# Patient Record
Sex: FEMALE | Race: WHITE | NOT HISPANIC OR LATINO
[De-identification: names, ages, dates, MRNs, and addresses within clinical notes are randomized per-mention and may not be internally consistent; named-entity substitution may affect disease eponyms.]

---

## 2017-02-08 ENCOUNTER — OTHER (OUTPATIENT)
Age: 67
End: 2017-02-08

## 2017-07-26 ENCOUNTER — APPOINTMENT (OUTPATIENT)
Dept: PULMONOLOGY | Facility: CLINIC | Age: 67
End: 2017-07-26

## 2017-07-26 VITALS — HEART RATE: 69 BPM | OXYGEN SATURATION: 97 % | SYSTOLIC BLOOD PRESSURE: 116 MMHG | DIASTOLIC BLOOD PRESSURE: 80 MMHG

## 2017-07-26 VITALS — WEIGHT: 116 LBS

## 2018-01-10 ENCOUNTER — APPOINTMENT (OUTPATIENT)
Dept: PULMONOLOGY | Facility: CLINIC | Age: 68
End: 2018-01-10
Payer: COMMERCIAL

## 2018-01-10 VITALS
SYSTOLIC BLOOD PRESSURE: 128 MMHG | WEIGHT: 117.5 LBS | OXYGEN SATURATION: 98 % | HEART RATE: 68 BPM | DIASTOLIC BLOOD PRESSURE: 72 MMHG

## 2018-01-10 PROCEDURE — 99214 OFFICE O/P EST MOD 30 MIN: CPT

## 2018-01-10 RX ORDER — ZOLPIDEM TARTRATE 5 MG/1
5 TABLET ORAL
Qty: 1 | Refills: 0 | Status: DISCONTINUED | COMMUNITY
Start: 2017-07-26 | End: 2018-01-10

## 2018-02-09 ENCOUNTER — RECORD ABSTRACTING (OUTPATIENT)
Age: 68
End: 2018-02-09

## 2018-02-09 DIAGNOSIS — Z82.49 FAMILY HISTORY OF ISCHEMIC HEART DISEASE AND OTHER DISEASES OF THE CIRCULATORY SYSTEM: ICD-10-CM

## 2018-02-09 DIAGNOSIS — K21.9 GASTRO-ESOPHAGEAL REFLUX DISEASE W/OUT ESOPHAGITIS: ICD-10-CM

## 2018-02-09 DIAGNOSIS — Z92.89 PERSONAL HISTORY OF OTHER MEDICAL TREATMENT: ICD-10-CM

## 2018-02-09 RX ORDER — ASPIRIN 81 MG
81 TABLET, DELAYED RELEASE (ENTERIC COATED) ORAL DAILY
Refills: 0 | Status: ACTIVE | COMMUNITY

## 2018-02-09 RX ORDER — ACETAMINOPHEN 325 MG/1
325 TABLET, FILM COATED ORAL
Refills: 0 | Status: ACTIVE | COMMUNITY

## 2018-03-05 ENCOUNTER — RX RENEWAL (OUTPATIENT)
Age: 68
End: 2018-03-05

## 2018-04-25 ENCOUNTER — RECORD ABSTRACTING (OUTPATIENT)
Age: 68
End: 2018-04-25

## 2018-04-26 ENCOUNTER — APPOINTMENT (OUTPATIENT)
Dept: CARDIOLOGY | Facility: CLINIC | Age: 68
End: 2018-04-26
Payer: COMMERCIAL

## 2018-04-26 VITALS
HEIGHT: 60 IN | BODY MASS INDEX: 23.16 KG/M2 | WEIGHT: 118 LBS | RESPIRATION RATE: 14 BRPM | DIASTOLIC BLOOD PRESSURE: 70 MMHG | HEART RATE: 72 BPM | SYSTOLIC BLOOD PRESSURE: 117 MMHG
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
## 2018-04-26 PROCEDURE — 99214 OFFICE O/P EST MOD 30 MIN: CPT

## 2018-04-26 PROCEDURE — 93000 ELECTROCARDIOGRAM COMPLETE: CPT

## 2018-07-13 ENCOUNTER — APPOINTMENT (OUTPATIENT)
Dept: PULMONOLOGY | Facility: CLINIC | Age: 68
End: 2018-07-13
Payer: COMMERCIAL

## 2018-07-13 VITALS
HEIGHT: 60 IN | SYSTOLIC BLOOD PRESSURE: 120 MMHG | WEIGHT: 120 LBS | HEART RATE: 68 BPM | OXYGEN SATURATION: 96 % | BODY MASS INDEX: 23.56 KG/M2 | DIASTOLIC BLOOD PRESSURE: 70 MMHG

## 2018-07-13 PROCEDURE — 94010 BREATHING CAPACITY TEST: CPT

## 2018-07-13 PROCEDURE — 99214 OFFICE O/P EST MOD 30 MIN: CPT | Mod: 25

## 2018-07-19 ENCOUNTER — RX RENEWAL (OUTPATIENT)
Age: 68
End: 2018-07-19

## 2018-07-26 ENCOUNTER — MOBILE ON CALL (OUTPATIENT)
Age: 68
End: 2018-07-26

## 2018-07-27 ENCOUNTER — RX RENEWAL (OUTPATIENT)
Age: 68
End: 2018-07-27

## 2018-10-17 ENCOUNTER — APPOINTMENT (OUTPATIENT)
Dept: CARDIOLOGY | Facility: CLINIC | Age: 68
End: 2018-10-17
Payer: COMMERCIAL

## 2018-10-17 VITALS
RESPIRATION RATE: 14 BRPM | HEIGHT: 60 IN | SYSTOLIC BLOOD PRESSURE: 139 MMHG | WEIGHT: 120 LBS | DIASTOLIC BLOOD PRESSURE: 70 MMHG | BODY MASS INDEX: 23.56 KG/M2 | HEART RATE: 67 BPM

## 2018-10-17 VITALS — SYSTOLIC BLOOD PRESSURE: 126 MMHG | DIASTOLIC BLOOD PRESSURE: 78 MMHG

## 2018-10-17 DIAGNOSIS — I87.2 VENOUS INSUFFICIENCY (CHRONIC) (PERIPHERAL): ICD-10-CM

## 2018-10-17 PROCEDURE — 99214 OFFICE O/P EST MOD 30 MIN: CPT

## 2018-10-17 PROCEDURE — 93000 ELECTROCARDIOGRAM COMPLETE: CPT

## 2018-10-17 RX ORDER — NICOTINE 21-14-7MG
21-14-7 KIT TRANSDERMAL
Qty: 1 | Refills: 0 | Status: DISCONTINUED | COMMUNITY
Start: 2018-04-26 | End: 2018-10-17

## 2018-10-17 RX ORDER — ADHESIVE TAPE 3"X 2.3 YD
50 MCG TAPE, NON-MEDICATED TOPICAL
Refills: 0 | Status: ACTIVE | COMMUNITY

## 2018-10-17 RX ORDER — KRILL/OM-3/DHA/EPA/PHOSPHO/AST 350MG-90MG
CAPSULE ORAL
Refills: 0 | Status: ACTIVE | COMMUNITY

## 2018-10-17 RX ORDER — TOBRAMYCIN AND DEXAMETHASONE 3; 1 MG/ML; MG/ML
0.3-0.1 SUSPENSION/ DROPS OPHTHALMIC
Qty: 5 | Refills: 0 | Status: DISCONTINUED | COMMUNITY
Start: 2018-04-13 | End: 2018-10-17

## 2018-11-07 ENCOUNTER — APPOINTMENT (OUTPATIENT)
Dept: CARDIOLOGY | Facility: CLINIC | Age: 68
End: 2018-11-07
Payer: COMMERCIAL

## 2018-11-07 PROCEDURE — 93306 TTE W/DOPPLER COMPLETE: CPT

## 2019-01-02 ENCOUNTER — APPOINTMENT (OUTPATIENT)
Dept: CARDIOLOGY | Facility: CLINIC | Age: 69
End: 2019-01-02

## 2019-01-15 ENCOUNTER — APPOINTMENT (OUTPATIENT)
Dept: PULMONOLOGY | Facility: CLINIC | Age: 69
End: 2019-01-15
Payer: COMMERCIAL

## 2019-01-15 VITALS — SYSTOLIC BLOOD PRESSURE: 110 MMHG | OXYGEN SATURATION: 96 % | DIASTOLIC BLOOD PRESSURE: 62 MMHG | HEART RATE: 65 BPM

## 2019-01-15 VITALS — WEIGHT: 121 LBS | HEIGHT: 59.5 IN | BODY MASS INDEX: 24.07 KG/M2

## 2019-01-15 PROCEDURE — 99214 OFFICE O/P EST MOD 30 MIN: CPT | Mod: 25

## 2019-01-15 PROCEDURE — 94010 BREATHING CAPACITY TEST: CPT

## 2019-01-15 RX ORDER — CICLOPIROX 80 MG/ML
8 SOLUTION TOPICAL
Qty: 66 | Refills: 0 | Status: DISCONTINUED | COMMUNITY
Start: 2018-02-22 | End: 2019-01-15

## 2019-01-15 NOTE — DISCUSSION/SUMMARY
[COPD] : chronic obstructive pulmonary disease [Current Tobacco Use] : current tobacco use [Stable] : stable [Responding to Treatment] : responding to treatment [Stage II (Moderate)] : stage II (moderate) [None] : There are no changes in medication management [Patient] : the patient

## 2019-01-15 NOTE — PROCEDURE
[___%] : last visit [unfilled]U% [Spirometry] : stable [FreeTextEntry1] : Chest CT performed as a radiology on 4/27/18 demonstrated stable tiny pulmonary nodules. Bilateral measuring largest 7 mm in the left lower lobe. No new nodules seen. Minimal scattered focal density in the posterior segment of the trachea most likely related to secretions. (Films reviewed by PACs)\par \par

## 2019-01-15 NOTE — PHYSICAL EXAM
[General Appearance - Well Developed] : well developed [Normal Appearance] : normal appearance [Well Groomed] : well groomed [General Appearance - Well Nourished] : well nourished [No Deformities] : no deformities [General Appearance - In No Acute Distress] : no acute distress [Normal Conjunctiva] : the conjunctiva exhibited no abnormalities [Eyelids - No Xanthelasma] : the eyelids demonstrated no xanthelasmas [Normal Oropharynx] : normal oropharynx [IV] : IV [Neck Appearance] : the appearance of the neck was normal [Neck Cervical Mass (___cm)] : no neck mass was observed [Jugular Venous Distention Increased] : there was no jugular-venous distention [Thyroid Diffuse Enlargement] : the thyroid was not enlarged [Thyroid Nodule] : there were no palpable thyroid nodules [Neck Circumference: ___] : neck circumference is [unfilled] [Heart Rate And Rhythm] : heart rate and rhythm were normal [Heart Sounds] : normal S1 and S2 [Murmurs] : no murmurs present [Respiration, Rhythm And Depth] : normal respiratory rhythm and effort [Exaggerated Use Of Accessory Muscles For Inspiration] : no accessory muscle use [Auscultation Breath Sounds / Voice Sounds] : lungs were clear to auscultation bilaterally [Abdomen Soft] : soft [Abdomen Tenderness] : non-tender [Abdomen Mass (___ Cm)] : no abdominal mass palpated [Abnormal Walk] : normal gait [Gait - Sufficient For Exercise Testing] : the gait was sufficient for exercise testing [Nail Clubbing] : no clubbing of the fingernails [Cyanosis, Localized] : no localized cyanosis [Petechial Hemorrhages (___cm)] : no petechial hemorrhages [Skin Color & Pigmentation] : normal skin color and pigmentation [] : no rash [No Venous Stasis] : no venous stasis [Skin Lesions] : no skin lesions [No Skin Ulcers] : no skin ulcer [No Xanthoma] : no  xanthoma was observed [Deep Tendon Reflexes (DTR)] : deep tendon reflexes were 2+ and symmetric [Sensation] : the sensory exam was normal to light touch and pinprick [No Focal Deficits] : no focal deficits

## 2019-01-15 NOTE — HISTORY OF PRESENT ILLNESS
[Doing Well] : doing well [None] : The patient is currently asymptomatic [Difficulty Breathing During Exertion] : denies dyspnea on exertion [Feelings Of Weakness On Exertion] : denies exercise intolerance [Cough] : denies coughing [Wheezing] : denies wheezing [Regional Soft Tissue Swelling Both Lower Extremities] : denies lower extremity edema [Cigarette ___ppd] : [unfilled] cigarette pack(s) per day [Adherent] : the patient is adherent with ~his/her~ medication regimen ["Doesn't Seem To Help"] : belief that the medication is not helping [Not Taking due to Cost of Med] : not taking the medication due to the cost [de-identified] : During endoscopy had episode apnea following propofol, Was brought to [facility] ER and neg.1/11/19 [de-identified] : some hoarsenes and tired post procedure

## 2019-01-15 NOTE — CONSULT LETTER
[Dear  ___] : Dear  [unfilled], [Consult Letter:] : I had the pleasure of evaluating your patient, [unfilled]. [Please see my note below.] : Please see my note below. [Sincerely,] : Sincerely, [Sunny Fowler MD] : Sunny Fowler MD [FreeTextEntry3] : Sunny Fowler MD FCCP\par Pulmonary/Critical Care/Sleep Medicine\par Department of Internal Medicine\par \par [facility] [employer]\par

## 2019-01-29 ENCOUNTER — NON-APPOINTMENT (OUTPATIENT)
Age: 69
End: 2019-01-29

## 2019-01-29 ENCOUNTER — APPOINTMENT (OUTPATIENT)
Dept: CARDIOLOGY | Facility: CLINIC | Age: 69
End: 2019-01-29
Payer: COMMERCIAL

## 2019-01-29 VITALS — SYSTOLIC BLOOD PRESSURE: 140 MMHG | DIASTOLIC BLOOD PRESSURE: 70 MMHG

## 2019-01-29 VITALS
DIASTOLIC BLOOD PRESSURE: 79 MMHG | BODY MASS INDEX: 24.39 KG/M2 | WEIGHT: 121 LBS | HEART RATE: 77 BPM | HEIGHT: 59 IN | SYSTOLIC BLOOD PRESSURE: 136 MMHG | RESPIRATION RATE: 14 BRPM

## 2019-01-29 DIAGNOSIS — R07.9 CHEST PAIN, UNSPECIFIED: ICD-10-CM

## 2019-01-29 PROCEDURE — 99214 OFFICE O/P EST MOD 30 MIN: CPT

## 2019-01-29 PROCEDURE — 93000 ELECTROCARDIOGRAM COMPLETE: CPT

## 2019-01-29 RX ORDER — BUPROPION HYDROCHLORIDE 100 MG/1
TABLET, FILM COATED ORAL
Refills: 0 | Status: DISCONTINUED | COMMUNITY
End: 2019-01-29

## 2019-01-29 NOTE — DISCUSSION/SUMMARY
[FreeTextEntry1] : Patient is a 67yo F with COPD, moderate subclinical atherosclerotic disease in the lower extremities, HTN here for cardiac follow up. Carotid with plaque but no stenosis. Moderate disease in the lower extremities as well but no clear obstruction. Continue medical management. BP a bit high for first time, she will recheck with PMD next week and may need antihypertensives if persistently elevated. CP remains atypical, no clear response to NTG, lower suspicion for vasospastic angina. Apneic episode was procedurally and sedation related, no cardiac event based on records and history.\par \par 1. Continue ASA/statin and med management of  PAD/carotid disease\par 2. Coronary calcium scoring for further risk assessment and due to continue atypical chest pain, stress test last year negative for ischemia. \par 3. Counselled for more than 3 minutes on smoking cessation\par 4. Recommend 30 minutes aerobic activity 4 to 5 days per week \par 5. Recommend aggressive diet and lifestyle modifications \par 6. GI and PMD follow up\par 7. Call with CAC score results, otherwise follow up 6 months

## 2019-01-29 NOTE — PHYSICAL EXAM
[General Appearance - Well Developed] : well developed [Normal Appearance] : normal appearance [Well Groomed] : well groomed [General Appearance - Well Nourished] : well nourished [No Deformities] : no deformities [General Appearance - In No Acute Distress] : no acute distress [Normal Conjunctiva] : the conjunctiva exhibited no abnormalities [Eyelids - No Xanthelasma] : the eyelids demonstrated no xanthelasmas [Normal Oral Mucosa] : normal oral mucosa [No Oral Pallor] : no oral pallor [No Oral Cyanosis] : no oral cyanosis [Normal Jugular Venous A Waves Present] : normal jugular venous A waves present [Normal Jugular Venous V Waves Present] : normal jugular venous V waves present [No Jugular Venous Raygoza A Waves] : no jugular venous raygoza A waves [Respiration, Rhythm And Depth] : normal respiratory rhythm and effort [Exaggerated Use Of Accessory Muscles For Inspiration] : no accessory muscle use [Auscultation Breath Sounds / Voice Sounds] : lungs were clear to auscultation bilaterally [Heart Rate And Rhythm] : heart rate and rhythm were normal [Heart Sounds] : normal S1 and S2 [Murmurs] : no murmurs present [Abdomen Soft] : soft [Abdomen Tenderness] : non-tender [Abdomen Mass (___ Cm)] : no abdominal mass palpated [Abnormal Walk] : normal gait [Gait - Sufficient For Exercise Testing] : the gait was sufficient for exercise testing [Nail Clubbing] : no clubbing of the fingernails [Cyanosis, Localized] : no localized cyanosis [Petechial Hemorrhages (___cm)] : no petechial hemorrhages [Skin Color & Pigmentation] : normal skin color and pigmentation [] : no rash [No Venous Stasis] : no venous stasis [Skin Lesions] : no skin lesions [No Skin Ulcers] : no skin ulcer [No Xanthoma] : no  xanthoma was observed [Oriented To Time, Place, And Person] : oriented to person, place, and time [Affect] : the affect was normal [Mood] : the mood was normal [No Anxiety] : not feeling anxious

## 2019-01-29 NOTE — HISTORY OF PRESENT ILLNESS
[FreeTextEntry1] : Patient is a 69yo F with tobacco dependence, HLD COPD, PAD here for cardiac follow up.  feeling well. No exertional CP/SOB. Patient denies PND/orthopnea/edema/palpitations/syncope/claudication. Was having continued leg fatigue at last visit, statin held but no change and restarted. Also with intermittent non-exertional CP. Trial of NTG and took once without any relief. Took second tab under the tongue, few minutes later stopped. \par \par Was in [place] for EGD and had apnea briefly. ECG showed inferior and lateral ST depressions which are unchanged from previous ECGs. Sent home without complication from ED. \par \par \par ROS: GI and  negative

## 2019-01-29 NOTE — ASSESSMENT
[FreeTextEntry1] : ECG: SR, inferior and lateral NSST (unchanged from prior)\par \par ECHO 11/2018:\par 1. Normal BiV function, EF 60%\par 2. Borderline LAE\par 3. Mild TR, RVSP 40mmHg\par 4. Mild PI\par \par  HDL 80  TG 68 (9/2018)\par CAROTID DUPLEX 9/2018:\par 1. Mild plaque bilaterally\par 2. No hemodynamically significant stenosis\par \par LE ARTERIAL DUPLEX 9/2018:\par 1. Moderate plaque bilaterally\par 2. Abnormal biphasic flow in right popliteal artery and below\par 3. Abnormal biphasic flow throughout on the left\par 5. Monophaisc flow in the bilateral dorsalis pedis\par \par EXERCISE NUCLEAR STRESS TEST 2017: Negative for ischemia, achieved 8 METS, EF 63%, PAC/PVC's\par ECHO 2015: Normal BiV function, EF 55-60%, trivial MR, moderate TR

## 2019-06-26 ENCOUNTER — APPOINTMENT (OUTPATIENT)
Dept: CARDIOLOGY | Facility: CLINIC | Age: 69
End: 2019-06-26

## 2019-07-26 ENCOUNTER — APPOINTMENT (OUTPATIENT)
Dept: PULMONOLOGY | Facility: CLINIC | Age: 69
End: 2019-07-26

## 2019-07-29 ENCOUNTER — RX RENEWAL (OUTPATIENT)
Age: 69
End: 2019-07-29

## 2019-08-29 ENCOUNTER — APPOINTMENT (OUTPATIENT)
Dept: PULMONOLOGY | Facility: CLINIC | Age: 69
End: 2019-08-29
Payer: COMMERCIAL

## 2019-08-29 VITALS — SYSTOLIC BLOOD PRESSURE: 120 MMHG | DIASTOLIC BLOOD PRESSURE: 70 MMHG

## 2019-08-29 VITALS — HEART RATE: 88 BPM | OXYGEN SATURATION: 92 %

## 2019-08-29 VITALS — BODY MASS INDEX: 22.97 KG/M2 | WEIGHT: 117 LBS | HEIGHT: 60 IN

## 2019-08-29 PROCEDURE — 94664 DEMO&/EVAL PT USE INHALER: CPT | Mod: 59

## 2019-08-29 PROCEDURE — 94060 EVALUATION OF WHEEZING: CPT

## 2019-08-29 PROCEDURE — 99215 OFFICE O/P EST HI 40 MIN: CPT | Mod: 25

## 2019-08-29 RX ORDER — IPRATROPIUM BROMIDE AND ALBUTEROL 20; 100 UG/1; UG/1
20-100 SPRAY, METERED RESPIRATORY (INHALATION) 4 TIMES DAILY
Qty: 1 | Refills: 5 | Status: DISCONTINUED | COMMUNITY
Start: 2017-07-26 | End: 2019-08-29

## 2019-08-29 RX ORDER — ATORVASTATIN CALCIUM 20 MG/1
20 TABLET, FILM COATED ORAL
Qty: 90 | Refills: 0 | Status: DISCONTINUED | COMMUNITY
Start: 2018-07-27 | End: 2019-08-29

## 2019-08-29 RX ORDER — NITROGLYCERIN 0.3 MG/1
0.3 TABLET SUBLINGUAL
Qty: 15 | Refills: 3 | Status: DISCONTINUED | COMMUNITY
Start: 2018-10-17 | End: 2019-08-29

## 2019-08-29 NOTE — DISCUSSION/SUMMARY
[COPD] : chronic obstructive pulmonary disease [Current Tobacco Use] : current tobacco use [Stable] : stable [Responding to Treatment] : responding to treatment [Stage II (Moderate)] : stage II (moderate) [Patient] : the patient [FreeTextEntry1] : No evidence of abnormalities suggestive of tumor or inflammatory disease seen on CAT scan.\par \par Smoking cessation was discussed with the patient for approximately 10 minutes. This included the various options including self discontinuation, nicotine replacements, antidepressants, and nicotine antagonist/agonist (Chantix).\par \par Due to the patient's history of prior tobacco use they fulfill criteria for low dose, lung cancer screening. This method was described to the patient with criteria for greater than 30 pack years of smoking, over the age of 55, and screening for 15 years following cessation of tobacco use.\par  [Decompensated] : decompensated [Medication Changes Per Orders] : Medication changes are as documented in orders

## 2019-08-29 NOTE — PHYSICAL EXAM
[General Appearance - Well Developed] : well developed [Normal Appearance] : normal appearance [Well Groomed] : well groomed [General Appearance - Well Nourished] : well nourished [No Deformities] : no deformities [General Appearance - In No Acute Distress] : no acute distress [Normal Conjunctiva] : the conjunctiva exhibited no abnormalities [Eyelids - No Xanthelasma] : the eyelids demonstrated no xanthelasmas [Normal Oropharynx] : normal oropharynx [IV] : IV [Neck Appearance] : the appearance of the neck was normal [Neck Cervical Mass (___cm)] : no neck mass was observed [Jugular Venous Distention Increased] : there was no jugular-venous distention [Thyroid Nodule] : there were no palpable thyroid nodules [Thyroid Diffuse Enlargement] : the thyroid was not enlarged [Neck Circumference: ___] : neck circumference is [unfilled] [Heart Rate And Rhythm] : heart rate and rhythm were normal [Heart Sounds] : normal S1 and S2 [Murmurs] : no murmurs present [Respiration, Rhythm And Depth] : normal respiratory rhythm and effort [Exaggerated Use Of Accessory Muscles For Inspiration] : no accessory muscle use [Auscultation Breath Sounds / Voice Sounds] : lungs were clear to auscultation bilaterally [Abdomen Soft] : soft [Abdomen Tenderness] : non-tender [Abdomen Mass (___ Cm)] : no abdominal mass palpated [Abnormal Walk] : normal gait [Gait - Sufficient For Exercise Testing] : the gait was sufficient for exercise testing [Nail Clubbing] : no clubbing of the fingernails [Cyanosis, Localized] : no localized cyanosis [Petechial Hemorrhages (___cm)] : no petechial hemorrhages [Skin Color & Pigmentation] : normal skin color and pigmentation [] : no rash [No Venous Stasis] : no venous stasis [Skin Lesions] : no skin lesions [No Skin Ulcers] : no skin ulcer [No Xanthoma] : no  xanthoma was observed [Deep Tendon Reflexes (DTR)] : deep tendon reflexes were 2+ and symmetric [Sensation] : the sensory exam was normal to light touch and pinprick [No Focal Deficits] : no focal deficits

## 2019-08-29 NOTE — CONSULT LETTER
[Dear  ___] : Dear  [unfilled], [Consult Letter:] : I had the pleasure of evaluating your patient, [unfilled]. [Please see my note below.] : Please see my note below. [Sincerely,] : Sincerely, [FreeTextEntry3] : Sunny Fowler MD FCCP\par Pulmonary/Critical Care/Sleep Medicine\par Department of Internal Medicine\par \par [facility]\par

## 2019-08-29 NOTE — PHYSICAL EXAM
[General Appearance - Well Developed] : well developed [Normal Appearance] : normal appearance [Well Groomed] : well groomed [General Appearance - Well Nourished] : well nourished [No Deformities] : no deformities [General Appearance - In No Acute Distress] : no acute distress [Normal Conjunctiva] : the conjunctiva exhibited no abnormalities [Eyelids - No Xanthelasma] : the eyelids demonstrated no xanthelasmas [Normal Oropharynx] : normal oropharynx [IV] : IV [Neck Appearance] : the appearance of the neck was normal [Neck Cervical Mass (___cm)] : no neck mass was observed [Jugular Venous Distention Increased] : there was no jugular-venous distention [Thyroid Nodule] : there were no palpable thyroid nodules [Thyroid Diffuse Enlargement] : the thyroid was not enlarged [Neck Circumference: ___] : neck circumference is [unfilled] [Heart Rate And Rhythm] : heart rate and rhythm were normal [Heart Sounds] : normal S1 and S2 [Murmurs] : no murmurs present [Respiration, Rhythm And Depth] : normal respiratory rhythm and effort [Exaggerated Use Of Accessory Muscles For Inspiration] : no accessory muscle use [Auscultation Breath Sounds / Voice Sounds] : lungs were clear to auscultation bilaterally [Abdomen Tenderness] : non-tender [Abdomen Soft] : soft [Abdomen Mass (___ Cm)] : no abdominal mass palpated [Abnormal Walk] : normal gait [Gait - Sufficient For Exercise Testing] : the gait was sufficient for exercise testing [Cyanosis, Localized] : no localized cyanosis [Nail Clubbing] : no clubbing of the fingernails [Petechial Hemorrhages (___cm)] : no petechial hemorrhages [Skin Color & Pigmentation] : normal skin color and pigmentation [] : no rash [No Venous Stasis] : no venous stasis [Skin Lesions] : no skin lesions [No Xanthoma] : no  xanthoma was observed [No Skin Ulcers] : no skin ulcer [Deep Tendon Reflexes (DTR)] : deep tendon reflexes were 2+ and symmetric [Sensation] : the sensory exam was normal to light touch and pinprick [No Focal Deficits] : no focal deficits

## 2019-08-29 NOTE — HISTORY OF PRESENT ILLNESS
[Doing Well] : doing well [None] : The patient is currently asymptomatic [Cough] : denies coughing [Wheezing] : denies wheezing [Regional Soft Tissue Swelling Both Lower Extremities] : denies lower extremity edema [Cigarette ___ppd] : [unfilled] cigarette pack(s) per day [Adherent] : the patient is adherent with ~his/her~ medication regimen ["Doesn't Seem To Help"] : belief that the medication is not helping [Not Taking due to Cost of Med] : not taking the medication due to the cost [Feelings Of Weakness On Exertion] : worsened exercise intolerance [Difficulty Breathing During Exertion] : worsened dyspnea on exertion [Coughing Up Sputum] : denies coughing up sputum

## 2019-08-29 NOTE — CONSULT LETTER
[Dear  ___] : Dear  [unfilled], [Consult Letter:] : I had the pleasure of evaluating your patient, [unfilled]. [Please see my note below.] : Please see my note below. [Sincerely,] : Sincerely, [FreeTextEntry3] : Sunny Fowler MD FCCP\par Pulmonary/Critical Care/Sleep Medicine\par Department of Internal Medicine\par \par [facility] [employer]\par

## 2019-08-29 NOTE — PROCEDURE
[___%] : last visit [unfilled]U% [___%] : current visit [unfilled]U% [Spirometry] : worsening [FreeTextEntry1] : Please note that the patient was given a bronchodilator and improved her FVC and FEV1 by 17.6, and 19.6%, respectively\par \par

## 2019-10-15 ENCOUNTER — NON-APPOINTMENT (OUTPATIENT)
Age: 69
End: 2019-10-15

## 2019-10-15 ENCOUNTER — APPOINTMENT (OUTPATIENT)
Dept: CARDIOLOGY | Facility: CLINIC | Age: 69
End: 2019-10-15
Payer: COMMERCIAL

## 2019-10-15 VITALS
OXYGEN SATURATION: 99 % | HEART RATE: 73 BPM | WEIGHT: 118 LBS | SYSTOLIC BLOOD PRESSURE: 144 MMHG | BODY MASS INDEX: 23.16 KG/M2 | HEIGHT: 60 IN | RESPIRATION RATE: 14 BRPM | DIASTOLIC BLOOD PRESSURE: 72 MMHG

## 2019-10-15 PROCEDURE — 99214 OFFICE O/P EST MOD 30 MIN: CPT

## 2019-10-15 PROCEDURE — 93000 ELECTROCARDIOGRAM COMPLETE: CPT

## 2019-10-15 RX ORDER — MELOXICAM 15 MG/1
TABLET ORAL
Refills: 0 | Status: DISCONTINUED | COMMUNITY
End: 2019-10-15

## 2019-10-15 RX ORDER — UMECLIDINIUM BROMIDE AND VILANTEROL TRIFENATATE 62.5; 25 UG/1; UG/1
62.5-25 POWDER RESPIRATORY (INHALATION) DAILY
Qty: 1 | Refills: 5 | Status: DISCONTINUED | COMMUNITY
Start: 2019-08-29 | End: 2019-10-15

## 2019-10-15 RX ORDER — BISOPROLOL FUMARATE AND HYDROCHLOROTHIAZIDE 2.5; 6.25 MG/1; MG/1
2.5-6.25 TABLET, FILM COATED ORAL
Refills: 0 | Status: DISCONTINUED | COMMUNITY
End: 2019-10-15

## 2019-10-15 RX ORDER — MULTIVITAMIN
TABLET ORAL
Refills: 0 | Status: DISCONTINUED | COMMUNITY
End: 2019-10-15

## 2019-10-15 RX ORDER — BISOPROLOL FUMARATE 5 MG/1
TABLET, FILM COATED ORAL DAILY
Refills: 0 | Status: DISCONTINUED | COMMUNITY
End: 2019-10-15

## 2019-10-15 NOTE — PHYSICAL EXAM
[General Appearance - Well Developed] : well developed [General Appearance - Well Nourished] : well nourished [General Appearance - In No Acute Distress] : no acute distress [Normal Conjunctiva] : the conjunctiva exhibited no abnormalities [Normal Oral Mucosa] : normal oral mucosa [Normal Jugular Venous V Waves Present] : normal jugular venous V waves present [Respiration, Rhythm And Depth] : normal respiratory rhythm and effort [] : no respiratory distress [Heart Rate And Rhythm] : heart rate and rhythm were normal [Auscultation Breath Sounds / Voice Sounds] : lungs were clear to auscultation bilaterally [Heart Sounds] : normal S1 and S2 [Murmurs] : no murmurs present [Abdomen Tenderness] : non-tender [Bowel Sounds] : normal bowel sounds [Abdomen Soft] : soft [Abdomen Mass (___ Cm)] : no abdominal mass palpated [Abnormal Walk] : normal gait [Cyanosis, Localized] : no localized cyanosis [Nail Clubbing] : no clubbing of the fingernails [Skin Color & Pigmentation] : normal skin color and pigmentation [Oriented To Time, Place, And Person] : oriented to person, place, and time [FreeTextEntry1] : no edema

## 2019-10-15 NOTE — DISCUSSION/SUMMARY
[FreeTextEntry1] : Patient is a 68yo F with COPD, moderate subclinical atherosclerotic disease in the lower extremities, HTN here for cardiac follow up. Carotid with plaque but no stenosis. Moderate disease in the lower extremities as well but no clear obstruction. Continue medical management. Zero calcium score as well which is reassuring. NEver started bisoprolol-hczt which was recently prescribed. NOt taking her statin as well. \par \par 1. Continue ASA and med management of  PAD/carotid disease. Recommend statin but remains reluctant\par 2. Start metoprolol ER 25mg daily for HTN, also with palps/APCs and "hyper" which this may help with\par 3. Counselled for more than 3 minutes on smoking cessation, will be using chantiz \par 4. Recommend 30 minutes moderate intensity aerobic activity 5 days per week \par 5. Recommend aggressive diet and lifestyle modifications \par 6. Follow up 3 months \par

## 2019-10-15 NOTE — HISTORY OF PRESENT ILLNESS
[FreeTextEntry1] : Patient is a 70yo F with tobacco dependence, HLD COPD, PAD here for cardiac follow up.  feeling well. No exertional CP/SOB. Patient denies PND/orthopnea/edema/palpitations/syncope/claudication. No new symptoms or complaints. \par \par ROS: GI and  negative  , feeling "hyper"

## 2020-02-10 ENCOUNTER — APPOINTMENT (OUTPATIENT)
Dept: PULMONOLOGY | Facility: CLINIC | Age: 70
End: 2020-02-10
Payer: COMMERCIAL

## 2020-02-10 VITALS
HEART RATE: 65 BPM | RESPIRATION RATE: 12 BRPM | DIASTOLIC BLOOD PRESSURE: 80 MMHG | SYSTOLIC BLOOD PRESSURE: 150 MMHG | OXYGEN SATURATION: 98 %

## 2020-02-10 DIAGNOSIS — Z12.9 ENCOUNTER FOR SCREENING FOR MALIGNANT NEOPLASM, SITE UNSPECIFIED: ICD-10-CM

## 2020-02-10 PROCEDURE — 94010 BREATHING CAPACITY TEST: CPT

## 2020-02-10 PROCEDURE — 99214 OFFICE O/P EST MOD 30 MIN: CPT | Mod: 25

## 2020-02-10 NOTE — HISTORY OF PRESENT ILLNESS
[Doing Well] : doing well [None] : The patient is currently asymptomatic [Feelings Of Weakness On Exertion] : worsened exercise intolerance [Difficulty Breathing During Exertion] : worsened dyspnea on exertion [Cough] : denies coughing [Coughing Up Sputum] : denies coughing up sputum [Regional Soft Tissue Swelling Both Lower Extremities] : denies lower extremity edema [Cigarette ___ppd] : [unfilled] cigarette pack(s) per day [Wheezing] : denies wheezing [Adherent] : the patient is adherent with ~his/her~ medication regimen ["Doesn't Seem To Help"] : belief that the medication is not helping [Not Taking due to Cost of Med] : not taking the medication due to the cost [de-identified] : recent sinus trac infection

## 2020-02-10 NOTE — CONSULT LETTER
[Dear  ___] : Dear  [unfilled], [Consult Letter:] : I had the pleasure of evaluating your patient, [unfilled]. [Please see my note below.] : Please see my note below. [Sincerely,] : Sincerely, [FreeTextEntry3] : Sunny Fowler MD FCCP\par Pulmonary/Critical Care/Sleep Medicine\par Department of Internal Medicine\par \par [facility] [employer] [facility]\par

## 2020-02-10 NOTE — PROCEDURE
[FreeTextEntry1] : Please note that the patient was given a bronchodilator and improved her FVC and FEV1 by 17.6, and 19.6%, respectively\par \par  [___%] : last visit [unfilled]U% [Spirometry] : improving

## 2020-02-10 NOTE — PHYSICAL EXAM
[General Appearance - Well Developed] : well developed [Normal Appearance] : normal appearance [General Appearance - Well Nourished] : well nourished [Well Groomed] : well groomed [General Appearance - In No Acute Distress] : no acute distress [Normal Conjunctiva] : the conjunctiva exhibited no abnormalities [No Deformities] : no deformities [Eyelids - No Xanthelasma] : the eyelids demonstrated no xanthelasmas [Normal Oropharynx] : normal oropharynx [IV] : IV [Neck Appearance] : the appearance of the neck was normal [Jugular Venous Distention Increased] : there was no jugular-venous distention [Neck Cervical Mass (___cm)] : no neck mass was observed [Thyroid Diffuse Enlargement] : the thyroid was not enlarged [Thyroid Nodule] : there were no palpable thyroid nodules [Neck Circumference: ___] : neck circumference is [unfilled] [Heart Rate And Rhythm] : heart rate and rhythm were normal [Heart Sounds] : normal S1 and S2 [Murmurs] : no murmurs present [Respiration, Rhythm And Depth] : normal respiratory rhythm and effort [Exaggerated Use Of Accessory Muscles For Inspiration] : no accessory muscle use [Abdomen Soft] : soft [Auscultation Breath Sounds / Voice Sounds] : lungs were clear to auscultation bilaterally [Abdomen Tenderness] : non-tender [Abdomen Mass (___ Cm)] : no abdominal mass palpated [Abnormal Walk] : normal gait [Gait - Sufficient For Exercise Testing] : the gait was sufficient for exercise testing [Nail Clubbing] : no clubbing of the fingernails [Petechial Hemorrhages (___cm)] : no petechial hemorrhages [Cyanosis, Localized] : no localized cyanosis [Skin Color & Pigmentation] : normal skin color and pigmentation [] : no rash [Skin Lesions] : no skin lesions [No Skin Ulcers] : no skin ulcer [No Venous Stasis] : no venous stasis [No Xanthoma] : no  xanthoma was observed [Deep Tendon Reflexes (DTR)] : deep tendon reflexes were 2+ and symmetric [Sensation] : the sensory exam was normal to light touch and pinprick [No Focal Deficits] : no focal deficits

## 2020-02-10 NOTE — DISCUSSION/SUMMARY
[COPD] : chronic obstructive pulmonary disease [FreeTextEntry1] : \par \par Smoking cessation was discussed with the patient for approximately 10 minutes. This included the various options including self discontinuation, nicotine replacements, antidepressants, and nicotine antagonist/agonist (Chantix).\par \par Due to the patient's history of prior tobacco use they fulfill criteria for low dose, lung cancer screening. This method was described to the patient with criteria for greater than 30 pack years of smoking, over the age of 55, and screening for 15 years following cessation of tobacco use.\par  [Stable] : stable [Current Tobacco Use] : current tobacco use [Responding to Treatment] : responding to treatment [Stage II (Moderate)] : stage II (moderate) [Patient] : the patient [None] : There are no changes in medication management

## 2020-02-17 ENCOUNTER — APPOINTMENT (OUTPATIENT)
Dept: CARDIOLOGY | Facility: CLINIC | Age: 70
End: 2020-02-17

## 2021-02-04 DIAGNOSIS — Z01.818 ENCOUNTER FOR OTHER PREPROCEDURAL EXAMINATION: ICD-10-CM

## 2021-02-09 ENCOUNTER — APPOINTMENT (OUTPATIENT)
Dept: DISASTER EMERGENCY | Facility: CLINIC | Age: 71
End: 2021-02-09

## 2021-03-02 ENCOUNTER — APPOINTMENT (OUTPATIENT)
Dept: DISASTER EMERGENCY | Facility: CLINIC | Age: 71
End: 2021-03-02

## 2021-03-03 LAB — SARS-COV-2 N GENE NPH QL NAA+PROBE: NOT DETECTED

## 2021-03-05 ENCOUNTER — APPOINTMENT (OUTPATIENT)
Dept: PULMONOLOGY | Facility: CLINIC | Age: 71
End: 2021-03-05

## 2021-03-05 ENCOUNTER — APPOINTMENT (OUTPATIENT)
Dept: PULMONOLOGY | Facility: CLINIC | Age: 71
End: 2021-03-05
Payer: COMMERCIAL

## 2021-03-05 VITALS
TEMPERATURE: 97.2 F | RESPIRATION RATE: 13 BRPM | BODY MASS INDEX: 22.58 KG/M2 | HEART RATE: 70 BPM | SYSTOLIC BLOOD PRESSURE: 160 MMHG | WEIGHT: 115 LBS | DIASTOLIC BLOOD PRESSURE: 70 MMHG | OXYGEN SATURATION: 98 % | HEIGHT: 60 IN

## 2021-03-05 DIAGNOSIS — G47.33 OBSTRUCTIVE SLEEP APNEA (ADULT) (PEDIATRIC): ICD-10-CM

## 2021-03-05 PROCEDURE — G0296 VISIT TO DETERM LDCT ELIG: CPT

## 2021-03-05 PROCEDURE — 99072 ADDL SUPL MATRL&STAF TM PHE: CPT

## 2021-03-05 PROCEDURE — 99214 OFFICE O/P EST MOD 30 MIN: CPT | Mod: 25

## 2021-03-05 PROCEDURE — 99406 BEHAV CHNG SMOKING 3-10 MIN: CPT

## 2021-06-21 NOTE — DISCUSSION/SUMMARY
[COPD] : chronic obstructive pulmonary disease [Current Tobacco Use] : current tobacco use [Stable] : stable [Responding to Treatment] : responding to treatment [Stage II (Moderate)] : stage II (moderate) [None] : There are no changes in medication management [Patient] : the patient [Obstructive Sleep Apnea] : obstructive sleep apnea [Sleep Study] : sleep study [Alcohol Avoidance] : alcohol avoidance [Sedative Avoidance] : sedative avoidance [Weight Loss Program] : weight loss program [FreeTextEntry1] : Chest CT shows no evidence of malignant appearing lesions. Chronic nodules, most likely inflammatory in nature.\par \par Smoking cessation was discussed with the patient for approximately 10 minutes. This included the various options including self discontinuation, nicotine replacements, antidepressants, and nicotine antagonist/agonist (Chantix).\par \par Due to the patient's history of prior tobacco use they fulfill criteria for low dose, lung cancer screening. This method was described to the patient with criteria for greater than 30 pack years of smoking, over the age of 55, and screening for 15 years following cessation of tobacco use.\par

## 2021-06-21 NOTE — PROCEDURE
[___%] : personal best [unfilled]U% [FreeTextEntry1] : Please note that the patient was given a bronchodilator and improved her FVC and FEV1 by 17.6, and 19.6%, respectively\par \par

## 2021-06-21 NOTE — CONSULT LETTER
[Dear  ___] : Dear  [unfilled], [Consult Letter:] : I had the pleasure of evaluating your patient, [unfilled]. [Please see my note below.] : Please see my note below. [Sincerely,] : Sincerely, [FreeTextEntry3] : Sunny Fowler MD FCCP\par Pulmonary/Critical Care/Sleep Medicine\par Department of Internal Medicine\par \par [facility] [employer]\par

## 2021-06-21 NOTE — HISTORY OF PRESENT ILLNESS
[Doing Well] : doing well [None] : The patient is currently asymptomatic [Cough] : denies coughing [Coughing Up Sputum] : denies coughing up sputum [Wheezing] : denies wheezing [Regional Soft Tissue Swelling Both Lower Extremities] : denies lower extremity edema [Cigarette ___ppd] : [unfilled] cigarette pack(s) per day [Adherent] : the patient is adherent with ~his/her~ medication regimen ["Doesn't Seem To Help"] : belief that the medication is not helping [Not Taking due to Cost of Med] : not taking the medication due to the cost [Difficulty Breathing During Exertion] : stable dyspnea on exertion [Feelings Of Weakness On Exertion] : stable exercise intolerance [Awakes Unrefreshed] : awakes unrefreshed [Awakes with Dry Mouth] : awakes with dry mouth [Awakes with Headache] : awakes with headache [Daytime Somnolence] : daytime somnolence [Snoring] : snoring [Witnessed Apneas] : witnessed apneas [Current] : current [TextBox_11] : 2 [TextBox_13] : 36 [TextBox_29] : smoking 1PPD currently [de-identified] : recent sinus trac infection [ESS] : 2

## 2021-06-21 NOTE — COUNSELING
[Risk of tobacco use and health benefits of smoking cessation discussed] : Risk of tobacco use and health benefits of smoking cessation discussed [Cessation strategies including cessation program discussed] : Cessation strategies including cessation program discussed [Use of nicotine replacement therapies and other medications discussed] : Use of nicotine replacement therapies and other medications discussed [Encouraged to pick a quit date and identify support needed to quit] : Encouraged to pick a quit date and identify support needed to quit [Tobacco Use Cessation Intermediate Greater Than 3 Minutes Up to 10 Minutes] : Tobacco Use Cessation Intermediate Greater Than 3 Minutes Up to 10 Minutes [ - Annual Lung Cancer Screening/Share Decision Making Discussion] : Annual Lung Cancer Screening/Share Decision Making Discussion. (I have advised this patient to have a Low Dose CT (LDCT) scan of the lungs and have discussed the following with the patient in a shared decision making discussion:   Benefits of Detection and Early Treatment: There is adequate evidence that annual screening for lung cancer with LDCT in a population of high-risk persons can prevent a substantial number of lung cancer–related deaths. The magnitude of benefit depends on the individual patient's risk for lung cancer, as those who are at highest risk are most likely to benefit. Screening cannot prevent most lung cancer–related deaths, and does not replace smoking cessation. Harms of Detection and Early Intervention and Treatment: The harms associated with LDCT screening include false-negative and false-positive results, incidental findings, over diagnosis, and radiation exposure. False-positive LDCT results occur in a substantial proportion of screened persons; 95% of all positive results do not lead to a diagnosis of cancer. In a high-quality screening program, further imaging can resolve most false-positive results; however, some patients may require invasive procedures. Radiation harms, including cancer resulting from cumulative exposure to radiation, vary depending on the age at the start of screening; the number of scans received; and the person's exposure to other sources of radiation, particularly other medical imaging.) [FreeTextEntry2] : smoker

## 2021-07-14 ENCOUNTER — NON-APPOINTMENT (OUTPATIENT)
Age: 71
End: 2021-07-14

## 2021-09-14 ENCOUNTER — NON-APPOINTMENT (OUTPATIENT)
Age: 71
End: 2021-09-14

## 2021-09-14 ENCOUNTER — APPOINTMENT (OUTPATIENT)
Dept: CARDIOLOGY | Facility: CLINIC | Age: 71
End: 2021-09-14
Payer: COMMERCIAL

## 2021-09-14 VITALS
RESPIRATION RATE: 16 BRPM | HEART RATE: 63 BPM | BODY MASS INDEX: 22.58 KG/M2 | DIASTOLIC BLOOD PRESSURE: 81 MMHG | HEIGHT: 60 IN | SYSTOLIC BLOOD PRESSURE: 162 MMHG | OXYGEN SATURATION: 97 % | WEIGHT: 115 LBS

## 2021-09-14 DIAGNOSIS — R29.898 OTHER SYMPTOMS AND SIGNS INVOLVING THE MUSCULOSKELETAL SYSTEM: ICD-10-CM

## 2021-09-14 PROCEDURE — 99214 OFFICE O/P EST MOD 30 MIN: CPT

## 2021-09-14 PROCEDURE — 93000 ELECTROCARDIOGRAM COMPLETE: CPT

## 2021-09-14 RX ORDER — VARENICLINE TARTRATE 0.5 (11)-1
0.5 MG X 11 & KIT ORAL
Qty: 1 | Refills: 0 | Status: DISCONTINUED | COMMUNITY
Start: 2019-08-29 | End: 2021-09-14

## 2021-09-14 RX ORDER — VARENICLINE TARTRATE 1 MG/1
1 TABLET, FILM COATED ORAL TWICE DAILY
Qty: 1 | Refills: 4 | Status: DISCONTINUED | COMMUNITY
Start: 2019-08-29 | End: 2021-09-14

## 2021-09-14 RX ORDER — METOPROLOL SUCCINATE 25 MG/1
25 TABLET, EXTENDED RELEASE ORAL DAILY
Qty: 30 | Refills: 5 | Status: DISCONTINUED | COMMUNITY
Start: 2019-10-15 | End: 2021-09-14

## 2021-09-14 RX ORDER — ZOLPIDEM TARTRATE 10 MG/1
10 TABLET ORAL
Qty: 1 | Refills: 0 | Status: DISCONTINUED | COMMUNITY
Start: 2021-03-05 | End: 2021-09-14

## 2021-09-14 NOTE — PHYSICAL EXAM
[General Appearance - Well Developed] : well developed [General Appearance - Well Nourished] : well nourished [General Appearance - In No Acute Distress] : no acute distress [Normal Conjunctiva] : the conjunctiva exhibited no abnormalities [Normal Oral Mucosa] : normal oral mucosa [Normal Jugular Venous V Waves Present] : normal jugular venous V waves present [] : no respiratory distress [Respiration, Rhythm And Depth] : normal respiratory rhythm and effort [Auscultation Breath Sounds / Voice Sounds] : lungs were clear to auscultation bilaterally [Heart Rate And Rhythm] : heart rate and rhythm were normal [Heart Sounds] : normal S1 and S2 [Murmurs] : no murmurs present [Bowel Sounds] : normal bowel sounds [Abdomen Soft] : soft [Abdomen Tenderness] : non-tender [Abdomen Mass (___ Cm)] : no abdominal mass palpated [Abnormal Walk] : normal gait [Nail Clubbing] : no clubbing of the fingernails [Cyanosis, Localized] : no localized cyanosis [Skin Color & Pigmentation] : normal skin color and pigmentation [Oriented To Time, Place, And Person] : oriented to person, place, and time [FreeTextEntry1] : no edema

## 2021-09-14 NOTE — ASSESSMENT
[FreeTextEntry1] : ECG: SR, APC, inferior and lateral ST depressions \par \par CAC = 0 (2/2019) \par \par ECHO 11/2018:\par 1. Normal BiV function, EF 60%\par 2. Borderline LAE\par 3. Mild TR, RVSP 40mmHg\par 4. Mild PI\par \par  HDL 80  TG 68 (9/2018)\par CAROTID DUPLEX 9/2018:\par 1. Mild plaque bilaterally\par 2. No hemodynamically significant stenosis\par \par LE ARTERIAL DUPLEX 9/2018:\par 1. Moderate plaque bilaterally\par 2. Abnormal biphasic flow in right popliteal artery and below\par 3. Abnormal biphasic flow throughout on the left\par 5. Monophaisc flow in the bilateral dorsalis pedis\par \par EXERCISE NUCLEAR STRESS TEST 2017: Negative for ischemia, achieved 8 METS, EF 63%, PAC/PVC's\par ECHO 2015: Normal BiV function, EF 55-60%, trivial MR, moderate TR

## 2021-09-14 NOTE — HISTORY OF PRESENT ILLNESS
[FreeTextEntry1] : Patient is a 72yo F with tobacco dependence, HLD COPD, PAD, APC/PVCs here for cardiac follow up. Has not been feeling well this past year. Feels very tired/fatigued, no change in sleeping habits. Legs feel heavy during the day. Doesnt do heavy physical work but has symptoms with food shipping and around house. Also feels heart racing/pounding during the day. Worse at night. NO PND/orthopnea/syncope/edema. Denies CP or dyspnea. \par \par Lives with , concerned about him due to lung disease and prior lung cancer. Brother  of COVID.  \par \par ROS: GI and  negative

## 2021-09-14 NOTE — DISCUSSION/SUMMARY
[FreeTextEntry1] : Patient is a 72yo F with COPD, moderate subclinical atherosclerotic disease in the lower extremities, HTN here for cardiac follow up. \par -CAC in 2019 = zero \par -ECG today with APCs, otherwise unchanged \par -Having progressive symptoms this past year of fatigue, leg fatigue/heaviness, palpitations and overall not feeling well. Certainly significant stress and BP up. Will need to evaluate for structural heart disease/ischemia and PAD. May all be stress related. Will get back on statin and metoprolol to help BP/palps/Ectopy \par \par 1. Continue ASA and restart atorvastatin 20mg qhs\par 2. Start metoprolol ER 25mg daily for HTN, also with palps/APCs/PVCs\par 3. Echo and nuclear stress testing to evaluate symptoms, states unable to go on treadmill\par 4. CHARLEEN/LE arterial duplex to evaluate leg symtpoms in patient with known plaque and continued smoking\par 5. Not ready to quit smoking\par 6. Follow up after testing

## 2021-09-27 ENCOUNTER — APPOINTMENT (OUTPATIENT)
Dept: CARDIOLOGY | Facility: CLINIC | Age: 71
End: 2021-09-27
Payer: COMMERCIAL

## 2021-09-27 PROCEDURE — 93306 TTE W/DOPPLER COMPLETE: CPT

## 2021-10-18 ENCOUNTER — APPOINTMENT (OUTPATIENT)
Dept: CARDIOLOGY | Facility: CLINIC | Age: 71
End: 2021-10-18

## 2021-10-27 ENCOUNTER — APPOINTMENT (OUTPATIENT)
Dept: CARDIOLOGY | Facility: CLINIC | Age: 71
End: 2021-10-27
Payer: COMMERCIAL

## 2021-10-27 PROCEDURE — 93015 CV STRESS TEST SUPVJ I&R: CPT

## 2021-10-27 PROCEDURE — 78452 HT MUSCLE IMAGE SPECT MULT: CPT

## 2021-10-27 PROCEDURE — A9500: CPT

## 2021-10-27 RX ORDER — REGADENOSON 0.08 MG/ML
0.4 INJECTION, SOLUTION INTRAVENOUS
Qty: 4 | Refills: 0 | Status: COMPLETED | OUTPATIENT
Start: 2021-10-27

## 2021-10-27 RX ORDER — AMINOPHYLLINE 25 MG/ML
25 INJECTION, SOLUTION INTRAVENOUS
Qty: 0 | Refills: 0 | Status: COMPLETED | OUTPATIENT
Start: 2021-10-27

## 2021-10-27 RX ADMIN — REGADENOSON 0 MG/5ML: 0.08 INJECTION, SOLUTION INTRAVENOUS at 00:00

## 2021-10-27 RX ADMIN — AMINOPHYLLINE 0 MG/ML: 25 INJECTION, SOLUTION INTRAVENOUS at 00:00

## 2021-11-05 ENCOUNTER — APPOINTMENT (OUTPATIENT)
Dept: CARDIOLOGY | Facility: CLINIC | Age: 71
End: 2021-11-05
Payer: MEDICARE

## 2021-11-05 ENCOUNTER — NON-APPOINTMENT (OUTPATIENT)
Age: 71
End: 2021-11-05

## 2021-11-05 VITALS
OXYGEN SATURATION: 97 % | DIASTOLIC BLOOD PRESSURE: 78 MMHG | HEART RATE: 64 BPM | SYSTOLIC BLOOD PRESSURE: 175 MMHG | WEIGHT: 116 LBS | BODY MASS INDEX: 22.78 KG/M2 | HEIGHT: 60 IN

## 2021-11-05 DIAGNOSIS — R53.83 OTHER FATIGUE: ICD-10-CM

## 2021-11-05 PROCEDURE — 99214 OFFICE O/P EST MOD 30 MIN: CPT

## 2021-11-05 NOTE — HISTORY OF PRESENT ILLNESS
[FreeTextEntry1] : Patient is a 70yo F with tobacco dependence, HLD COPD, PAD, APC/PVCs here for cardiac follow up. Has not been feeling well this past year. Feels very tired/fatigued, no change in sleeping habits. Legs feel heavy during the day. Doesnt do heavy physical work but has symptoms with food shipping and around house. Also feels heart racing/pounding during the day. Worse at night. NO PND/orthopnea/syncope/edema. Denies CP or dyspnea. Due to symptoms underwent cardiac work up.  Metoprolol and statin restarted after last OV. When started felt a little dizzy and now still feels a bit tired. Thinks palpitation/irregular heart beat is better\par \par Lives with , concerned about him due to lung disease and prior lung cancer. Brother  of COVID.  \par \par ROS: GI and  negative

## 2021-11-05 NOTE — PHYSICAL EXAM
[General Appearance - Well Developed] : well developed [General Appearance - Well Nourished] : well nourished [General Appearance - In No Acute Distress] : no acute distress [Normal Conjunctiva] : the conjunctiva exhibited no abnormalities [Normal Oral Mucosa] : normal oral mucosa [Normal Jugular Venous V Waves Present] : normal jugular venous V waves present [] : no respiratory distress [Respiration, Rhythm And Depth] : normal respiratory rhythm and effort [Auscultation Breath Sounds / Voice Sounds] : lungs were clear to auscultation bilaterally [Heart Rate And Rhythm] : heart rate and rhythm were normal [Heart Sounds] : normal S1 and S2 [Murmurs] : no murmurs present [Bowel Sounds] : normal bowel sounds [Abdomen Tenderness] : non-tender [Abdomen Soft] : soft [Abdomen Mass (___ Cm)] : no abdominal mass palpated [Abnormal Walk] : normal gait [Nail Clubbing] : no clubbing of the fingernails [Cyanosis, Localized] : no localized cyanosis [Skin Color & Pigmentation] : normal skin color and pigmentation [Oriented To Time, Place, And Person] : oriented to person, place, and time [FreeTextEntry1] : no edema

## 2021-11-05 NOTE — DISCUSSION/SUMMARY
[FreeTextEntry1] : Patient is a 72yo F with COPD, moderate subclinical atherosclerotic disease in the lower extremities, HTN here for cardiac follow up. \par -Nuclear stress test 10/2201 negative ischemia\par -Echo with normal BiV function, Grade II diastolic dysfunction, mild MR and moderate TR. \par -LE arterial duplex 10/2021 without obstructive PAD, mild plaque. Med management \par -CAC in 2019 = zero \par -Having progressive symptoms this past year of fatigue, leg fatigue/heaviness, palpitations and overall not feeling well. Certainly significant stress and BP up in office.  Stress test negative and BP that day much better. Significant diastolic dysfunction on echo that maybe BP related and pressure higher on that day. Unclear if symptoms at all related but I think probably not. \par \par 1. Continue ASA and atorvastatin 20mg qhs\par 2. Continue metoprolol, will consider adding additional BP meds based on home monitoring. Helping palps, still fatigue but preceded restarting metop. \par 3. Not ready to quit smoking\par 4. Establish with new PMD planned\par 5. Follow up 3-4 months

## 2021-11-05 NOTE — ASSESSMENT
[FreeTextEntry1] : \par CAC = 0 (2/2019) \par \par PHARM NUCLEAR STRESS TEST 10/2021:\par 1. Negative for ischemia/infarct, EF 70%\par 2. Small ventricle\par 3. Normal BP response, rare APCs\par \par ECHO 9/2021:\par 1. Normal LV size and function, EF 55-60%\par 2. Grade II diastolic dysfunction\par 3. Mild LAE\par 4. Normal RV/RA\par 5. Mild MR, moderate TR. RVSP 45mmHg\par \par ECHO 11/2018:\par 1. Normal BiV function, EF 60%\par 2. Borderline LAE\par 3. Mild TR, RVSP 40mmHg\par 4. Mild PI\par \par LE ARTERIAL DUPLEX 10/2021\par 1. Mild plaque, no hemodynamically significant stenosis \par \par CAROTID DUPLEX 9/2018:\par 1. Mild plaque bilaterally\par 2. No hemodynamically significant stenosis\par \par LE ARTERIAL DUPLEX 9/2018:\par 1. Moderate plaque bilaterally\par 2. Abnormal biphasic flow in right popliteal artery and below\par 3. Abnormal biphasic flow throughout on the left\par 5. Monophaisc flow in the bilateral dorsalis pedis\par \par EXERCISE NUCLEAR STRESS TEST 2017: Negative for ischemia, achieved 8 METS, EF 63%, PAC/PVC's\par ECHO 2015: Normal BiV function, EF 55-60%, trivial MR, moderate TR

## 2022-01-06 RX ORDER — KIT FOR THE PREPARATION OF TECHNETIUM TC99M SESTAMIBI 1 MG/5ML
INJECTION, POWDER, LYOPHILIZED, FOR SOLUTION PARENTERAL
Refills: 0 | Status: COMPLETED | OUTPATIENT
Start: 2022-01-06

## 2022-01-06 RX ADMIN — KIT FOR THE PREPARATION OF TECHNETIUM TC99M SESTAMIBI 0: 1 INJECTION, POWDER, LYOPHILIZED, FOR SOLUTION PARENTERAL at 00:00

## 2022-03-24 DIAGNOSIS — Z01.811 ENCOUNTER FOR PREPROCEDURAL RESPIRATORY EXAMINATION: ICD-10-CM

## 2022-05-13 ENCOUNTER — APPOINTMENT (OUTPATIENT)
Dept: PULMONOLOGY | Facility: CLINIC | Age: 72
End: 2022-05-13

## 2022-06-27 LAB — SARS-COV-2 N GENE NPH QL NAA+PROBE: NOT DETECTED

## 2022-06-30 ENCOUNTER — APPOINTMENT (OUTPATIENT)
Dept: PULMONOLOGY | Facility: CLINIC | Age: 72
End: 2022-06-30

## 2022-06-30 VITALS
HEART RATE: 70 BPM | OXYGEN SATURATION: 98 % | SYSTOLIC BLOOD PRESSURE: 126 MMHG | RESPIRATION RATE: 16 BRPM | DIASTOLIC BLOOD PRESSURE: 72 MMHG

## 2022-06-30 VITALS — BODY MASS INDEX: 22.78 KG/M2 | WEIGHT: 113 LBS | HEIGHT: 59 IN

## 2022-06-30 DIAGNOSIS — J44.9 CHRONIC OBSTRUCTIVE PULMONARY DISEASE, UNSPECIFIED: ICD-10-CM

## 2022-06-30 PROCEDURE — G0296 VISIT TO DETERM LDCT ELIG: CPT

## 2022-06-30 PROCEDURE — 99214 OFFICE O/P EST MOD 30 MIN: CPT | Mod: 25

## 2022-06-30 PROCEDURE — 99406 BEHAV CHNG SMOKING 3-10 MIN: CPT

## 2022-06-30 PROCEDURE — 94010 BREATHING CAPACITY TEST: CPT

## 2022-06-30 RX ORDER — ALBUTEROL SULFATE 90 UG/1
108 (90 BASE) POWDER, METERED RESPIRATORY (INHALATION) EVERY 4 HOURS
Qty: 1 | Refills: 5 | Status: ACTIVE | COMMUNITY
Start: 2019-08-29 | End: 1900-01-01

## 2022-06-30 NOTE — PROCEDURE
[___%] : personal best [unfilled]U% [___%] : personal best [unfilled]U% [Spirometry] : worsening [FreeTextEntry1] : Please note that the patient was given a bronchodilator and improved her FVC and FEV1 by 17.6, and 19.6%, respectively\par \par

## 2022-06-30 NOTE — CONSULT LETTER
[Dear  ___] : Dear  [unfilled], [Consult Letter:] : I had the pleasure of evaluating your patient, [unfilled]. [Please see my note below.] : Please see my note below. [Sincerely,] : Sincerely, [FreeTextEntry3] : Sunny Fowler MD FCCP\par Pulmonary/Critical Care/Sleep Medicine\par Department of Internal Medicine\par \par [facility] [employer]\par

## 2022-06-30 NOTE — HISTORY OF PRESENT ILLNESS
[Current] : current [Awakes Unrefreshed] : awakes unrefreshed [Awakes with Dry Mouth] : awakes with dry mouth [Awakes with Headache] : awakes with headache [Daytime Somnolence] : daytime somnolence [Snoring] : snoring [Witnessed Apneas] : witnessed apneas [Doing Well] : doing well [None] : The patient is currently asymptomatic [Difficulty Breathing During Exertion] : stable dyspnea on exertion [Feelings Of Weakness On Exertion] : stable exercise intolerance [Cough] : denies coughing [Coughing Up Sputum] : denies coughing up sputum [Wheezing] : denies wheezing [Regional Soft Tissue Swelling Both Lower Extremities] : denies lower extremity edema [Cigarette ___ppd] : [unfilled] cigarette pack(s) per day [Adherent] : the patient is adherent with ~his/her~ medication regimen ["Doesn't Seem To Help"] : belief that the medication is not helping [Not Taking due to Cost of Med] : not taking the medication due to the cost [TextBox_11] : 2 [TextBox_13] : 37 [TextBox_29] : smoking 1PPD currently [de-identified] : recent sinus trac infection

## 2022-06-30 NOTE — COUNSELING
[Cessation strategies including cessation program discussed] : Cessation strategies including cessation program discussed [Use of nicotine replacement therapies and other medications discussed] : Use of nicotine replacement therapies and other medications discussed [Encouraged to pick a quit date and identify support needed to quit] : Encouraged to pick a quit date and identify support needed to quit [Smoking Cessation Program Referral] : Smoking Cessation Program Referral  [FreeTextEntry2] : smoker [FreeTextEntry1] : 3 [ - Annual Lung Cancer Screening/Share Decision Making Discussion] : Annual Lung Cancer Screening/Share Decision Making Discussion. (I have advised this patient to have a Low Dose CT (LDCT) scan of the lungs and have discussed the following with the patient in a shared decision making discussion:   Benefits of Detection and Early Treatment: There is adequate evidence that annual screening for lung cancer with LDCT in a population of high-risk persons can prevent a substantial number of lung cancer–related deaths. The magnitude of benefit depends on the individual patient's risk for lung cancer, as those who are at highest risk are most likely to benefit. Screening cannot prevent most lung cancer–related deaths, and does not replace smoking cessation. Harms of Detection and Early Intervention and Treatment: The harms associated with LDCT screening include false-negative and false-positive results, incidental findings, over diagnosis, and radiation exposure. False-positive LDCT results occur in a substantial proportion of screened persons; 95% of all positive results do not lead to a diagnosis of cancer. In a high-quality screening program, further imaging can resolve most false-positive results; however, some patients may require invasive procedures. Radiation harms, including cancer resulting from cumulative exposure to radiation, vary depending on the age at the start of screening; the number of scans received; and the person's exposure to other sources of radiation, particularly other medical imaging.)

## 2022-08-23 ENCOUNTER — NON-APPOINTMENT (OUTPATIENT)
Age: 72
End: 2022-08-23

## 2022-08-23 DIAGNOSIS — R91.8 OTHER NONSPECIFIC ABNORMAL FINDING OF LUNG FIELD: ICD-10-CM

## 2022-09-30 ENCOUNTER — APPOINTMENT (OUTPATIENT)
Dept: CARDIOLOGY | Facility: CLINIC | Age: 72
End: 2022-09-30

## 2022-09-30 ENCOUNTER — NON-APPOINTMENT (OUTPATIENT)
Age: 72
End: 2022-09-30

## 2022-09-30 VITALS
RESPIRATION RATE: 16 BRPM | HEIGHT: 59 IN | BODY MASS INDEX: 22.18 KG/M2 | WEIGHT: 110 LBS | HEART RATE: 77 BPM | SYSTOLIC BLOOD PRESSURE: 140 MMHG | DIASTOLIC BLOOD PRESSURE: 72 MMHG

## 2022-09-30 PROCEDURE — 99214 OFFICE O/P EST MOD 30 MIN: CPT

## 2022-09-30 PROCEDURE — 93000 ELECTROCARDIOGRAM COMPLETE: CPT

## 2022-09-30 NOTE — ASSESSMENT
[FreeTextEntry1] : ECG: SR, APCs, NSST \par \par CAC = 0 (2/2019) \par \par PHARM NUCLEAR STRESS TEST 10/2021:\par 1. Negative for ischemia/infarct, EF 70%\par 2. Small ventricle\par 3. Normal BP response, rare APCs\par \par ECHO 9/2021:\par 1. Normal LV size and function, EF 55-60%\par 2. Grade II diastolic dysfunction\par 3. Mild LAE\par 4. Normal RV/RA\par 5. Mild MR, moderate TR. RVSP 45mmHg\par \par ECHO 11/2018:\par 1. Normal BiV function, EF 60%\par 2. Borderline LAE\par 3. Mild TR, RVSP 40mmHg\par 4. Mild PI\par \par LE ARTERIAL DUPLEX 10/2021\par 1. Mild plaque, no hemodynamically significant stenosis \par \par CAROTID DUPLEX 9/2018:\par 1. Mild plaque bilaterally\par 2. No hemodynamically significant stenosis\par \par LE ARTERIAL DUPLEX 9/2018:\par 1. Moderate plaque bilaterally\par 2. Abnormal biphasic flow in right popliteal artery and below\par 3. Abnormal biphasic flow throughout on the left\par 5. Monophaisc flow in the bilateral dorsalis pedis\par \par

## 2022-09-30 NOTE — DISCUSSION/SUMMARY
[FreeTextEntry1] : Patient is a 71yo F with COPD, moderate subclinical atherosclerotic disease in the lower extremities, HTN here for cardiac follow up. \par -Nuclear stress test 10/2021 negative ischemia\par -CAC in 2019 = zero \par \par -Echo with normal BiV function, Grade II diastolic dysfunction, mild MR and moderate TR. \par -LE arterial duplex 10/2021 without obstructive PAD, mild plaque. Med management \par \par -Increased palps lately, partly from APCs but constellation of symptoms are out of proportion to mild ectopy and unlikely related. Will place holter and increase metoprolol to 50mg daily. REcommend PMD and consider neurology evaluation\par \par 1. Continue ASA and atorvastatin 20mg qhs\par 2. Increase metoprolol ER to 50mg daily, holter and echo to evaluate symptoms\par 3. Not ready to quit smoking\par 4. Follow up in 3 months, call with holter results and echo\par 5. PMD follow up , needs BW to include CMP/CBC/TSH and consider neurology evaluation

## 2022-09-30 NOTE — HISTORY OF PRESENT ILLNESS
[FreeTextEntry1] : Patient is a 71yo F with tobacco dependence, HLD COPD, PAD, APC/PVCs here for cardiac follow up. NOt feeling well past few weeks. Had episode shaking, palpitations, tinnitus. Episodes come on suddenly, has happened for many years but seems worse lately. NO associated anxiety/stress. No exertional symptoms, no CP/SOB. No PND/orthopnea/edema/syncope. Feels wiped out after episodes too. \par \par Lives with , concerned about him due to lung disease and prior lung cancer. Brother  of COVID.  \par \par ROS: GI and  negative

## 2022-10-24 ENCOUNTER — APPOINTMENT (OUTPATIENT)
Dept: CARDIOLOGY | Facility: CLINIC | Age: 72
End: 2022-10-24

## 2022-10-24 PROCEDURE — 93306 TTE W/DOPPLER COMPLETE: CPT

## 2022-11-26 ENCOUNTER — RX RENEWAL (OUTPATIENT)
Age: 72
End: 2022-11-26

## 2022-11-29 ENCOUNTER — NON-APPOINTMENT (OUTPATIENT)
Age: 72
End: 2022-11-29

## 2022-12-01 ENCOUNTER — APPOINTMENT (OUTPATIENT)
Dept: PULMONOLOGY | Facility: CLINIC | Age: 72
End: 2022-12-01

## 2022-12-01 PROCEDURE — 99441: CPT | Mod: 95

## 2022-12-07 ENCOUNTER — NON-APPOINTMENT (OUTPATIENT)
Age: 72
End: 2022-12-07

## 2022-12-07 ENCOUNTER — APPOINTMENT (OUTPATIENT)
Dept: CARDIOLOGY | Facility: CLINIC | Age: 72
End: 2022-12-07

## 2022-12-07 VITALS — HEART RATE: 54 BPM | BODY MASS INDEX: 21.57 KG/M2 | HEIGHT: 59 IN | OXYGEN SATURATION: 97 % | WEIGHT: 107 LBS

## 2022-12-07 VITALS — SYSTOLIC BLOOD PRESSURE: 120 MMHG | DIASTOLIC BLOOD PRESSURE: 86 MMHG

## 2022-12-07 DIAGNOSIS — R94.31 ABNORMAL ELECTROCARDIOGRAM [ECG] [EKG]: ICD-10-CM

## 2022-12-07 DIAGNOSIS — R00.2 PALPITATIONS: ICD-10-CM

## 2022-12-07 PROCEDURE — 93000 ELECTROCARDIOGRAM COMPLETE: CPT

## 2022-12-07 PROCEDURE — 99214 OFFICE O/P EST MOD 30 MIN: CPT

## 2022-12-07 NOTE — DISCUSSION/SUMMARY
[FreeTextEntry1] : Patient is a 71yo F with COPD, moderate subclinical atherosclerotic disease in the lower extremities, HTN here for cardiac follow up. \par \par -Nuclear stress test 10/2021 negative ischemia\par -CAC in 2019 = zero \par \par -Echo with normal BiV function, Grade II diastolic dysfunction, mild MR and moderate TR. \par -LE arterial duplex 10/2021 without obstructive PAD, mild plaque. Med management \par \par -Holter with frequent PVCs, echo with normal LV function and diastolic dysfunction. Mild to modearte MR, mild TR need surveillance only. Dilated ascending aorta surveillance as well (2.5cm/m2) \par \par 1. Continue ASA and atorvastatin 20mg qhs\par 2. Continue higher dose metoprolol ER 50mgd daily, add Magnesium supplement for ectopy. Given loose stools, recommend Mg Oxide 400mg daily\par 3. Not ready to quit smoking\par 4. Patient is at low cardiovascular risk for low risk surgery \par 5. Follow up 3 months to re-eval symptoms\par  [EKG obtained to assist in diagnosis and management of assessed problem(s)] : EKG obtained to assist in diagnosis and management of assessed problem(s)

## 2022-12-07 NOTE — ASSESSMENT
[FreeTextEntry1] : ECG: SR, APCs, NSST , ? non-conducted APC\par \par HOLTER 01/2022:\par 1. SR\par 2. Multifocal PVCs, 259/hour with some bigeminy and trigeminy \par \par ECHO 10/2022:\par 1. Normal LV size and function, EF 55-60%\par 2. Findings suggestive grade II DDfx\par 3. Normal RV function\par 4. Moderate LAE, mild KYLE\par 5. Mild to moderate MR\par 6. Mild TR\par 7. Ascending aorta 3.6cm, 2.5cm/m2\par \par CAC = 0 (2/2019) \par \par PHARM NUCLEAR STRESS TEST 10/2021:\par 1. Negative for ischemia/infarct, EF 70%\par 2. Small ventricle\par 3. Normal BP response, rare APCs\par \par ECHO 9/2021:\par 1. Normal LV size and function, EF 55-60%\par 2. Grade II diastolic dysfunction\par 3. Mild LAE\par 4. Normal RV/RA\par 5. Mild MR, moderate TR. RVSP 45mmHg\par \par ECHO 11/2018:\par 1. Normal BiV function, EF 60%\par 2. Borderline LAE\par 3. Mild TR, RVSP 40mmHg\par 4. Mild PI\par \par LE ARTERIAL DUPLEX 10/2021\par 1. Mild plaque, no hemodynamically significant stenosis \par \par CAROTID DUPLEX 9/2018:\par 1. Mild plaque bilaterally\par 2. No hemodynamically significant stenosis\par \par LE ARTERIAL DUPLEX 9/2018:\par 1. Moderate plaque bilaterally\par 2. Abnormal biphasic flow in right popliteal artery and below\par 3. Abnormal biphasic flow throughout on the left\par 5. Monophaisc flow in the bilateral dorsalis pedis\par \par

## 2022-12-07 NOTE — HISTORY OF PRESENT ILLNESS
[FreeTextEntry1] : Patient is a 71yo F with tobacco dependence, HLD COPD, PAD, APC/PVCs here for cardiac follow up. NOt feeling well prior to last OV.  Had episode shaking, palpitations, tinnitus. Episodes come on suddenly, has happened for many years but seems worse lately. NO associated anxiety/stress. No exertional symptoms, no CP/SOB. No PND/orthopnea/edema/syncope. Feels wiped out after episodes too. \par \par Due to symptoms underwent holter/echo, metoprolol increased as well. ALso struggling with loose stools and unable to tolerate PO, difficult to leave the house. Planned for colonoscopy. Has lost some weight. Palps are better. but still symptomatic. \par \par Lives with , concerned about him due to lung disease and prior lung cancer. Brother  of COVID.  \par \par ROS: Neuro and  negative

## 2023-03-24 ENCOUNTER — APPOINTMENT (OUTPATIENT)
Dept: CARDIOLOGY | Facility: CLINIC | Age: 73
End: 2023-03-24
Payer: MEDICARE

## 2023-03-24 ENCOUNTER — NON-APPOINTMENT (OUTPATIENT)
Age: 73
End: 2023-03-24

## 2023-03-24 VITALS
OXYGEN SATURATION: 98 % | SYSTOLIC BLOOD PRESSURE: 150 MMHG | HEART RATE: 66 BPM | WEIGHT: 102 LBS | DIASTOLIC BLOOD PRESSURE: 90 MMHG | HEIGHT: 59 IN | BODY MASS INDEX: 20.56 KG/M2 | RESPIRATION RATE: 16 BRPM

## 2023-03-24 VITALS — DIASTOLIC BLOOD PRESSURE: 70 MMHG | SYSTOLIC BLOOD PRESSURE: 142 MMHG

## 2023-03-24 DIAGNOSIS — I73.9 PERIPHERAL VASCULAR DISEASE, UNSPECIFIED: ICD-10-CM

## 2023-03-24 PROCEDURE — 99214 OFFICE O/P EST MOD 30 MIN: CPT

## 2023-03-24 PROCEDURE — 93000 ELECTROCARDIOGRAM COMPLETE: CPT

## 2023-03-24 RX ORDER — BUDESONIDE 3 MG/1
3 CAPSULE, COATED PELLETS ORAL
Refills: 0 | Status: ACTIVE | COMMUNITY

## 2023-03-24 NOTE — ADDENDUM
[FreeTextEntry1] : PAtient states told not to take atorvastatin for unclear reasons, given PAD I advised she restart. CT also with extensive vascular calcifications. No claudication, med management. Recheck lipids/CMP at follow up.

## 2023-03-24 NOTE — DISCUSSION/SUMMARY
[EKG obtained to assist in diagnosis and management of assessed problem(s)] : EKG obtained to assist in diagnosis and management of assessed problem(s) [FreeTextEntry1] : Patient is a 74yo F with COPD, moderate subclinical atherosclerotic disease in the lower extremities, HTN here for cardiac follow up. \par \par -Nuclear stress test 10/2021 negative ischemia\par -CAC in 2019 = zero \par  \par -LE arterial duplex 10/2021 without obstructive PAD, mild plaque. Med management \par \par -Holter with frequent PVCs, echo 10/2022 with normal LV function and diastolic dysfunction. Mild to moderate MR, mild TR need surveillance only. Dilated ascending aorta surveillance as well (2.5cm/m2) \par \par 1. Continue ASA and atorvastatin 20mg qhs\par 2. Increase amlodipine-benazepril to 5/40mg daily and take metoprolol ER 50mg in evening for am palps\par 3. Not ready to quit smoking\par 4. Otherwise CV stable\par 5. Regular GI follow up \par 6. Follow up up 3 months\par 7. Re-evaluate dizziness at follow up, brief and maybe neurologic. Consider EST/30 day event at f/u

## 2023-03-24 NOTE — HISTORY OF PRESENT ILLNESS
[FreeTextEntry1] : Patient is a 74yo F with tobacco dependence, HLD COPD, PAD, APC/PVCs here for cardiac follow up. NOt feeling well prior to last OV.  Had episode shaking, palpitations, tinnitus. Episodes come on suddenly, has happened for many years but seems worse lately. NO associated anxiety/stress. No exertional symptoms, no CP/SOB. No PND/orthopnea/edema/syncope. Feels wiped out after episodes too. \par \par Due to symptoms underwent holter/echo, metoprolol increased as well. Noting palps still but feeling much better than last year, mostly feels it in mornings and better after takes BP medications.  .  Also struggling with loose stools and unable to tolerate PO, difficult to leave the house. Planned for colonoscopy and states had a few polpys, has colitis. Started on budesonide, but still has lost weight. BP at home higher as well, 140s systolic most of time. Will feel some dizziness if moves to quickly/fast, lasts second and resolves\par \par Lives with , concerned about him due to lung disease and prior lung cancer. Brother  of COVID.  \par \par ROS: Neuro and  negative

## 2023-03-24 NOTE — ASSESSMENT
[FreeTextEntry1] : ECG: SR, PVC, diffuse NSST\par \par HOLTER 09/2022:\par 1. SR\par 2. Multifocal PVCs, 259/hour with some bigeminy and trigeminy \par \par ECHO 10/2022:\par 1. Normal LV size and function, EF 55-60%\par 2. Findings suggestive grade II DDfx\par 3. Normal RV function\par 4. Moderate LAE, mild KYLE\par 5. Mild to moderate MR\par 6. Mild TR\par 7. Ascending aorta 3.6cm, 2.5cm/m2\par \par CAC = 0 (2/2019) \par \par PHARM NUCLEAR STRESS TEST 10/2021:\par 1. Negative for ischemia/infarct, EF 70%\par 2. Small ventricle\par 3. Normal BP response, rare APCs\par \par ECHO 9/2021:\par 1. Normal LV size and function, EF 55-60%\par 2. Grade II diastolic dysfunction\par 3. Mild LAE\par 4. Normal RV/RA\par 5. Mild MR, moderate TR. RVSP 45mmHg\par \par ECHO 11/2018:\par 1. Normal BiV function, EF 60%\par 2. Borderline LAE\par 3. Mild TR, RVSP 40mmHg\par 4. Mild PI\par \par LE ARTERIAL DUPLEX 10/2021\par 1. Mild plaque, no hemodynamically significant stenosis \par \par CAROTID DUPLEX 9/2018:\par 1. Mild plaque bilaterally\par 2. No hemodynamically significant stenosis\par \par LE ARTERIAL DUPLEX 9/2018:\par 1. Moderate plaque bilaterally\par 2. Abnormal biphasic flow in right popliteal artery and below\par 3. Abnormal biphasic flow throughout on the left\par 5. Monophaisc flow in the bilateral dorsalis pedis\par \par

## 2023-05-26 ENCOUNTER — APPOINTMENT (OUTPATIENT)
Dept: PULMONOLOGY | Facility: CLINIC | Age: 73
End: 2023-05-26

## 2023-06-09 ENCOUNTER — APPOINTMENT (OUTPATIENT)
Dept: CARDIOLOGY | Facility: CLINIC | Age: 73
End: 2023-06-09
Payer: MEDICARE

## 2023-06-09 ENCOUNTER — NON-APPOINTMENT (OUTPATIENT)
Age: 73
End: 2023-06-09

## 2023-06-09 VITALS
SYSTOLIC BLOOD PRESSURE: 162 MMHG | RESPIRATION RATE: 15 BRPM | BODY MASS INDEX: 21.57 KG/M2 | HEART RATE: 61 BPM | OXYGEN SATURATION: 97 % | WEIGHT: 107 LBS | DIASTOLIC BLOOD PRESSURE: 90 MMHG | HEIGHT: 59 IN

## 2023-06-09 DIAGNOSIS — I49.3 VENTRICULAR PREMATURE DEPOLARIZATION: ICD-10-CM

## 2023-06-09 DIAGNOSIS — R42 DIZZINESS AND GIDDINESS: ICD-10-CM

## 2023-06-09 DIAGNOSIS — I34.0 NONRHEUMATIC MITRAL (VALVE) INSUFFICIENCY: ICD-10-CM

## 2023-06-09 DIAGNOSIS — I10 ESSENTIAL (PRIMARY) HYPERTENSION: ICD-10-CM

## 2023-06-09 DIAGNOSIS — I49.1 ATRIAL PREMATURE DEPOLARIZATION: ICD-10-CM

## 2023-06-09 DIAGNOSIS — E78.5 HYPERLIPIDEMIA, UNSPECIFIED: ICD-10-CM

## 2023-06-09 PROCEDURE — 93000 ELECTROCARDIOGRAM COMPLETE: CPT

## 2023-06-09 PROCEDURE — 99214 OFFICE O/P EST MOD 30 MIN: CPT

## 2023-06-09 RX ORDER — OLMESARTAN MEDOXOMIL / AMLODIPINE BESYLATE / HYDROCHLOROTHIAZIDE 40; 10; 12.5 MG/1; MG/1; MG/1
40-10-12.5 TABLET, FILM COATED ORAL DAILY
Qty: 30 | Refills: 5 | Status: ACTIVE | COMMUNITY
Start: 2023-06-09 | End: 1900-01-01

## 2023-06-09 RX ORDER — ATORVASTATIN CALCIUM 40 MG/1
40 TABLET, FILM COATED ORAL
Qty: 90 | Refills: 2 | Status: ACTIVE | COMMUNITY
Start: 1900-01-01 | End: 1900-01-01

## 2023-06-09 RX ORDER — AMLODIPINE BESYLATE AND BENAZEPRIL HYDROCHLORIDE 5; 40 MG/1; MG/1
5-40 CAPSULE ORAL
Qty: 90 | Refills: 1 | Status: DISCONTINUED | COMMUNITY
Start: 2022-10-25 | End: 2023-06-09

## 2023-06-09 RX ORDER — GLUCOSAMINE/MSM/CHONDROIT SULF 500-166.6
10 TABLET ORAL
Refills: 0 | Status: ACTIVE | COMMUNITY

## 2023-06-09 NOTE — ASSESSMENT
[FreeTextEntry1] : ECG: SR, APC, diffuse NSST (no change from prior) \par \par \par  HDL 77  TG 66 (5/2023)\par CMP unremarkable other than borderline glucose at 102\par \par HOLTER 09/2022:\par 1. SR\par 2. Multifocal PVCs, 259/hour with some bigeminy and trigeminy \par \par ECHO 10/2022:\par 1. Normal LV size and function, EF 55-60%\par 2. Findings suggestive grade II DDfx\par 3. Normal RV function\par 4. Moderate LAE, mild KYLE\par 5. Mild to moderate MR\par 6. Mild TR\par 7. Ascending aorta 3.6cm, 2.5cm/m2\par \par CAC = 0 (2/2019) \par \par PHARM NUCLEAR STRESS TEST 10/2021:\par 1. Negative for ischemia/infarct, EF 70%\par 2. Small ventricle\par 3. Normal BP response, rare APCs\par \par ECHO 9/2021:\par 1. Normal LV size and function, EF 55-60%\par 2. Grade II diastolic dysfunction\par 3. Mild LAE\par 4. Normal RV/RA\par 5. Mild MR, moderate TR. RVSP 45mmHg\par \par ECHO 11/2018:\par 1. Normal BiV function, EF 60%\par 2. Borderline LAE\par 3. Mild TR, RVSP 40mmHg\par 4. Mild PI\par \par LE ARTERIAL DUPLEX 10/2021\par 1. Mild plaque, no hemodynamically significant stenosis \par \par CAROTID DUPLEX 9/2018:\par 1. Mild plaque bilaterally\par 2. No hemodynamically significant stenosis\par \par LE ARTERIAL DUPLEX 9/2018:\par 1. Moderate plaque bilaterally\par 2. Abnormal biphasic flow in right popliteal artery and below\par 3. Abnormal biphasic flow throughout on the left\par 5. Monophaisc flow in the bilateral dorsalis pedis\par \par

## 2023-06-09 NOTE — DISCUSSION/SUMMARY
[FreeTextEntry1] : Patient is a 72yo F with COPD, moderate subclinical atherosclerotic disease in the lower extremities, HTN here for cardiac follow up. \par \par -Nuclear stress test 10/2021 negative ischemia\par -CAC in 2019 = zero \par  \par -LE arterial duplex 10/2021 without obstructive PAD, mild plaque. Med management \par \par -Holter with frequent PVCs 9/2022\par -Echo 10/2022 with normal LV function and diastolic dysfunction. Mild to moderate MR, mild TR need surveillance only. Dilated ascending aorta surveillance as well (2.5cm/m2) \par \par -BP running high still and lipids as well. Partly steroid related\par \par 1. Continue ASA and increase atorvastatin 40mg daily\par 2. 30 day event monitor and EST to evaluate exertional dizziness/falls near syncope\par 3. Not ready to quit smoking\par 4. Follow up after testing \par 5. Regular GI follow up , struggling still with colitis. Weight stable, BW mostly unremarkable\par 6. Consider rheum evaluation, ? colitis autoimmune . Remains on steroids which is likely causing some side effects and HTN \par 7. Change BP medication to amlodipine-olmesartan-HCTZ 10/40/12.5 daily  [EKG obtained to assist in diagnosis and management of assessed problem(s)] : EKG obtained to assist in diagnosis and management of assessed problem(s)

## 2023-07-05 RX ORDER — METOPROLOL SUCCINATE 50 MG/1
50 TABLET, EXTENDED RELEASE ORAL DAILY
Qty: 90 | Refills: 1 | Status: ACTIVE | COMMUNITY
Start: 2021-09-14 | End: 1900-01-01

## 2023-07-14 ENCOUNTER — APPOINTMENT (OUTPATIENT)
Dept: CARDIOLOGY | Facility: CLINIC | Age: 73
End: 2023-07-14

## 2023-07-21 ENCOUNTER — APPOINTMENT (OUTPATIENT)
Dept: CARDIOLOGY | Facility: CLINIC | Age: 73
End: 2023-07-21

## 2023-10-16 DIAGNOSIS — F17.200 NICOTINE DEPENDENCE, UNSPECIFIED, UNCOMPLICATED: ICD-10-CM
